# Patient Record
(demographics unavailable — no encounter records)

---

## 2024-11-12 NOTE — HISTORY OF PRESENT ILLNESS
[FreeTextEntry1] : Follow up Sanjay is a 75 year old male presenting to [facility] care for history of epilepsy.  HPI Doing well No seizures EEG shows continuos R FC region. Doesn't want to increase dose  No other issues.  Urology w/up  History of L arm twitching as a kid in [facility]. Escaped to [place] during the revolution and had the first and only GTC of his life. On Dilantin for many years and switched to Oxcarb. Currently taking 300mg nightly.  Has not followed up with a neurologist for over 15 years but has previous records. EEG from 2005 showed focal right fronto-parietal sharp waves, possibly indicative of an underlying lesion. MRI in 2005 showed R somatosensory/post-central gyrus focus, recommended interval follow up. Notes his mother experienced an infection when she was 6 months pregnant with him, and previous neurologists attributed the possible lesion to this abnormal pregnancy.  Experiences focal seizures several times per week, always during sleep. Pt wakes up with L upper extremity myoclonus that lasts for several seconds, always retains awareness. Last occurred a few days ago. Can go a month without an episode. Has been on 300mg Oxcarb for many years.  PMH significant for SVT on Metoprolol. Also complains of lower extremity numbness. Describes it as feeling he has shoes on, but without wearing shoes.  HPI Had a focal seizure last week after he took bactrim. On  mg he has few events. Feeling better overall Takes all at night.  Sleep issues.

## 2024-11-12 NOTE — DISCUSSION/SUMMARY
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 Last MRI / EEG >15 years ago. Taking Oxcarb 450 mg for many years, still experiences occasional nocturnal focal left seizures.

## 2025-02-12 NOTE — LETTER BODY
[Dear  ___] : Dear  [unfilled], [Courtesy Letter:] : I had the pleasure of seeing your patient, [unfilled], in my office today. [Please see my note below.] : Please see my note below. [Consult Closing:] : Thank you very much for allowing me to participate in the care of this patient.  If you have any questions, please do not hesitate to contact me. [FreeTextEntry3] : Best Regards,   Jennifer Cruz MD

## 2025-02-12 NOTE — ASSESSMENT
[FreeTextEntry1] : We discussed the patient's lower urinary tract symptoms on tamsulosin 0.4 mg daily which she confirms are not to the level where he would like either more medication, higher doses, or surgical intervention at this point.  Tamsulosin was renewed at the present level.  As for the more pressing issue noted from his prior cystoscopy and my recommendations after that test, this was not performed because of his other health and neurologic issues.  These appear to be coming under better control but are not yet stable.  In view of this situation, urine sent today for culture cytology and FISH test and blood sent for PSA.  If all these remain stable, then we will allow him to continue as is while he stabilizes his other health issues and reassess in 6 months.  Otherwise, we will discuss further continue continuing with testing and or prostate biopsy sooner. Jennifer Cruz MD

## 2025-02-12 NOTE — HISTORY OF PRESENT ILLNESS
[FreeTextEntry1] : 75 YO M seen 2023 as NPT for an enlarged prostate. He told me that he has had an enlarged prostate for over 10 years. He saw urologist Fransisco 10 years ago and was prescribed tamsulosin 0.4 mg. He continues on this medication with reasonable results until the pandemic, when results waned. He finally stopped all together 2 - 3 weeks ago and feels that he is actually urinating better off it. He continues to have significant nocturia x 4 - 5 and averaged 500 ml/ night of urine. He denies dysuria, gross hematuria prior kidney stones.  Recent PSA 3.94 from .. No confirmed FH of prostate cancer, father  age 85.  UA large RBC (Chronic problem >> 10 years, no formal evaluation yet.) IPSS 22 KENYETTA NA ALEC 2 PVR 32 ml. We discussed the patient's lower urinary tract symptoms and marked nocturia.  In the face of his enlarged prostate gland but PVR, the reasons for his symptoms are not readily apparent.  We also discussed the paradoxical effect of improvement off of the tamsulosin.  I recommended further studies to elucidate the etiology of his symptoms and to determine the most appropriate treatment moving forward, either medication or otherwise.  To this end I recommended he return with a full bladder and undergo uroflowmetry and pelvic ultrasound.  I reviewed with him the indications risks alternatives and chances for success with these test and this approach and he is amenable.  Appointments were made.  PSA test was also repeated today to confirm stability over the last 8 months. As for the patient's chronic microscopic hematuria, I did send urine today for culture and cytology.  We will review these on his next visit and determine if any further evaluation is necessary at this time.  Patient seen 2023 to reassess LUTS, including uroflow and pelvic sonogram, and to repeat PSA and urine cytology. He reports a slight improvement in his lower urinary tract symptoms. He states that he's had chronic microhematuria for 20+ years.  UA large RBC IPSS 11 uroflow: inaccurate; avg rate 3 ml/s, max rate 10 ml/s (voided 72 cc) pelvic sono: PVR 50cc , 88 cc prostate We discussed the results of his uroflow, which was inaccurate. Since his lower urinary symptoms have slightly improved, we will reassess this in 1 month to repeat a uroflowmetry. He will come early and drink at the office to full bladder capacity. Regarding the chronic microhematuria and mildly abnormal recent urine cytology, a repeat cytology was sent, in addition to a FISH test. I explained the significance of these tests. If either of these tests are abnormal, he will have a hematuria evaluation including CT scan of the abdomen and pelvis, followed by an in-office cystoscopy. A PSA was repeated today to reassess his recent elevated PSA. However, he rode a bike 2 days ago. If this remains elevated, we will repeat his PSA (total and free) after at least 3 days of abstinence.  PSA 4.72  (50%) Improved, to repeat in 4 - 6 weeks. Urine cytology and FISH both negative.  Patient seen 3/24/2023 to reassess. Patient told me that he continues to find his LUTS bothersome. He is still reluctant to go on alpha blocker medication sine he feels that he is urinating better after stopping the tamsulosin.  UA large RBC (Chronic) Uroflow good: Vol 197 ml, V Max 13 ml/s, V Ave 8 ml/s,  ml. Patient also on medication for seizure disorder (carbamazepine) and for HTN/ SVT (amlodipine, metoprolol, rosuvastatin).  NKMA   The patient denies fevers, chills, nausea and or vomiting and no unexplained weight loss.  All pertinent parts of the patient PFSH (past medical, family and social histories), laboratory, radiological studies and physician notes were reviewed prior to starting the face to face portion of the  visit. Questionnaire results were discussed with patient. We discussed the patient's previous lab work which included a normal cytology and FISH test and an improved PSA value.  As planned we repeated the PSA today to trend the results.  We also repeated urine culture which was not done on his last visit due to lack of sufficient urine. We also discussed his persistent lower urinary tract symptoms in the face of mildly elevated PVR today but a relatively normal uroflowmetry test.  I gave the patient 2 alternatives to alpha-blocker medication since he continues to be resistant to trying this approach again.  These options include either no active treatment and follow-up to repeat the flow and PVR in 3 months or further evaluation with urodynamics and cystoscopy at this time.  He has elected to go with the former and we made plans for repeat evaluation in 3 months with uroflowmetry and PVR evaluation assuming that the blood and urine tests from today returned back stable to improved.  Patient is in agreement with this plan appointments were made.  PSA 4.51  Patient seen 2023 to reassess LUTS with uroflow and PVR. He remains off the tamsulosin and his lower urinary tract symptoms remain stable. UA large RBC (Chronic x >20 years). IPSS 14 Uroflow: avg rate 5.5 ml/s, max rate 13.4 ml/s (voided 100 cc) PVR 56 cc He is doing well urologically and he will remain off the tamsulosin in view of his stable LUTS. Regarding his longstanding microhematuria, a urine C+S and cytology are pending. A PSA is pending and we will call him with that result. If this remains stable and there are no new problems, he will follow up in 6 months. As for his up-coming CT scan and likely colonoscopy, we discussed and I recommended that he take tamsulosin the night before and the night of his planned tests and procedures to minimize the chance of urinary retention from his known BPH. PSA 5.49. F/u in 6 months as planned.  2024 Patient dropped off urine specimen yesterday, grossly bloody (old), confirmed by UA. C+S pending. Requires evaluation for gross hematuria with cytology, cystoscopy and CT scan. Staff to schedule F/U. C+S negative  Patient seen 2024 to assess the Gross hematuria. Requires CT scan and cystoscopy. he told me that he has had frequency, urgency dysuria with red urine over the last week. HE went to ER 2024, H+H 12.6/35.7 UA today, red urine (Cool aid)  Large RBC, Pro 100, WBC negative, Nitrite negative. CYSTOSCOPY: NO intrinsic bladder lesions, UOs not visible due to large, obstructing prostate with bullous inflammation of the prostatic urethra with bleeding. This appears to be the cause of his gross hematuria. Normal anterior urethra. Findings today were discussed with the patient and his wife. The bleeding from the prostate gland is likely from the bullous edema that lines the internal prostatic mucosa. The actual etiology of this remains uncertain at this time. I recommended that we proceed to CT scan of the abdomen pelvis with and without intravenous contact now to rule out any upper abdominal lesions. I also recommended that we treat the patient with resumption of the tamsulosin 0.4 mg and addition of finasteride 5 mg daily and a 7-day course of Bactrim DS. We will reassess his response course of action and review the CT scan when it is available. If the upper urinary tracts are normal, then we will plan to meet again in 1 month to repeat cystoscopy to reassess the prostatic urethra and hopefully view the ureteral orifices and a less bloody field.   2024 [facility] Sent: 2024 12:27:52 PM Eastern Standard Time Sender: Isabel Cruz MD Subject: RE: Had to stop two of the meds. Body: I will have my staff contact you to set up a phone consultation. ISABEL CRUZ MD  Sent: 2024 8:40:26 PM Eastern Standard Time Sender: Mickey Silverman Subject: RE: Had to stop two of the meds. Body: I was hoping to speak with you today regarding some challenges I've encountered with the medications. Notably, I chose not to discontinue Flomax as it has been helpful. However, the other two medications have posed significant issues, which I would like to discuss in detail once we have the opportunity to talk. 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 Additionally, I have been consistently experiencing heart palpitations throughout the day, a known side effect of the antibiotic. Interestingly, today I did not experience any heart palpitations. Furthermore, I've been dealing with persistent heartburn and acid reflux, which were not problems when I was on CIPRO. I'm curious about the choice of antibiotics and wondered why we did not use CIPRO which you mentioned while we were there.  Patient conference 2024 via TTM. He has not has any more seizures since he stopped Bactrim and Finasteride. He also notes relief of the heart palpitations and GERD since he stopped these, He continues on the tamsulosin 0.4 mg and the urine flow much easier.  WE discussed remaining off the finasteride and Bactrim and remaining on the tamsulosin. He has a follow-up appointment on  with a neurologist and a follow-up appointment in our office February night to retest his urine. He is also scheduled for a follow-up cystoscopy later in the month and I encouraged him to continue on these plans. Since his symptoms have improved on the present regimen of tamsulosin only, we will continue this treatment regimen for now.  CT scan 2024:  IMPRESSION: No urothelial lesion. The prostate is enlarged.  Patient seen 2024  to reassess. He told me that he has had no further gross hematuria and no further seizure activity. He remains on the tamsulosin.  UA large RBC IPSS 14 We reviewed the benign results of the CT scan from last week. I also reviewed. The urine culture today in view of his recent instrumentation and treatment. We discussed the continued need to completely clear the bladder for other possible causes of his gross hematuria since cystoscopy a few weeks ago while he was bleeding limited total visualization of the bladder mucosa. This is scheduled to take place later in the month and I encouraged him to maintain that appointment.   Patient seen 2024 via TTM after cancelling his follow up cystoscopy.  He told me that his urinary problems are better than the last 2 years. HIs main problems are now due to his GI issues. He is scheduled to see his GI MD next week. He is hesitant to undergo a repeat cystoscopy for the gross hematuria and asks if a MP MRI would suffice. I reviewed in detail the indications for repeat cystoscopy to fully clear his bladder with a possible reason for his gross hematuria. We reviewed the differences between MRI studies and what was appropriate to clear the bladder. In my opinion there is no substitute for cystoscopy and that is what I recommended to be done. After a lengthy discussion in which the patient had a chance to and all his questions and have them answered to his satisfaction, he subsequently decided to proceed with the cystoscopy in the office under local anesthesia and nitrous oxide inhalation. My staff will call him to schedule at his request.   Patient seen 5/10/2024 for cystoscopy. He told me that he has not had further gross hematuria but has also noted loss of ejaculate. He also has had pain in the right groin which has become worse since the gross hematuria. He has a known RIH which does extrude when he is up and about, but which he treats with a Truss. It easily retracrs with lying down. He comes today for repeat Cystoscopy. UA large RBC only. CYSTOSCOPY: No intrinsic bladder lesions, large capacity bladder, large and obstructing prostate with marked erythema, irregular mucosa, and inflammatory changes in the prostatic mucosa. These changes include the Veru which is swollen and likely is the cause of his loss of ejaculation.  I discussed findings today with the patient and his wife. I recommended that he undergo transurethral biopsy of the prostatic mucosa and limited resection of that area for diagnostic purposes. At cystoscopy under anesthesia we will also determine whether a biopsy or unroofing of the Veru should be considered. We reviewed the indication risks alternatives and chances for success with this approach. Because of the patient's cardiac and seizure history, we will have him evaluated by medicine cardiology and his neurologist prior to undergoing general anesthesia. The patient is in agreement with this plan and we will schedule this to be done with a 23-hour stay at [facility].   Patient seen TODAY 2025 to revisit these issues. He told me that he continues to have seizures and is under care of his Neurologist for these. He has not had any gross hematuria or dysuria, but does note difficulty starting the stream during the night. He remains on the tamsulosin 0.4 mg and requests a refill. He does NOT feel that his symptoms require either more medication or any surgical intervention. He has also noted intermittent discomfort in the right groin with ejaculation. UA large RBC IPSS 14

## 2025-02-12 NOTE — PHYSICAL EXAM
[General Appearance - In No Acute Distress] : no acute distress [Edema] : no peripheral edema [] : no respiratory distress [Abdomen Soft] : soft [Abdomen Tenderness] : non-tender [Abdomen Mass (___ Cm)] : no abdominal mass palpated [Abdomen Hernia] : no hernia was discovered [Costovertebral Angle Tenderness] : no ~M costovertebral angle tenderness [Urethral Meatus] : meatus normal [Epididymis] : the epididymides were normal [Testes Tenderness] : no tenderness of the testes [Testes Mass (___cm)] : there were no testicular masses [Prostate Tenderness] : the prostate was not tender [No Prostate Nodules] : no prostate nodules [Prostate Size ___ (0-4)] : prostate size [unfilled] (scale: 0-4) [Normal Station and Gait] : the gait and station were normal for the patient's age [Skin Turgor] : supple [No Focal Deficits] : no focal deficits [Oriented To Time, Place, And Person] : oriented to person, place, and time [Affect] : the affect was normal [Mood] : the mood was normal

## 2025-04-09 NOTE — HISTORY OF PRESENT ILLNESS
[FreeTextEntry1] : Follow up   Sanjay is a 76 year old male [facility].   HPI  2025  No changes No sz lately  Sleep issues   Goes to bed and urinate several times.  Med On  mg Qday  Doesnt want to change dose  Having same urologic conditions Some pedal edema  HPI Doing well  No seizures  EEG shows continuos R FC region. Doesn't want to increase dose  No other issues.   Urology w/up  History of L arm twitching as a kid in [facility]. Escaped to [place] during the revolution and had the first and only GTC of his life. On Dilantin for many years and switched to Oxcarb. Currently taking 300mg nightly.  Has not followed up with a neurologist for over 15 years but has previous records. EEG from  showed focal right fronto-parietal sharp waves, possibly indicative of an underlying lesion. MRI in  showed R somatosensory/post-central gyrus focus, recommended interval follow up. Notes his mother experienced an infection when she was 6 months pregnant with him, and previous neurologists attributed the possible lesion to this abnormal pregnancy.  Experiences focal seizures several times per week, always during sleep. Pt wakes up with L upper extremity myoclonus that lasts for several seconds, always retains awareness. Last occurred a few days ago. Can go a month without an episode. Has been on 300mg Oxcarb for many years.  PMH significant for SVT on Metoprolol. Also complains of lower extremity numbness. Describes it as feeling he has shoes on, but without wearing shoes.  HPI Had a focal seizure last week after he took bactrim. On  mg he has few evenst.  Feeling better overall   Active Problems Anxiety (300.00) (F41.9) Asymptomatic microscopic hematuria (599.72) (R31.21) Bilateral carotid artery disease, unspecified type (447.9) (I77.9) BPH associated with nocturia (600.01,788.43) (N40.1,R35.1) Chronic GERD (530.81) (K21.9) Chronic throat clearing (786.09) (R09.89) Clogged ear (388.8) (H93.8X9) Diverticulosis of colon, acquired (562.10) (K57.30) Elevated PSA (790.93) (R97.20) Essential hypertension (401.9) (I10) Microhematuria (599.72) (R31.29) Moderate aortic insufficiency (424.1) (I35.1) Moderate mitral regurgitation (424.0) (I34.0) Paroxysmal SVT (supraventricular tachycardia) (427.0) (I47.1) Preop cardiovascular exam (V72.81) (Z01.810) Seizure disorder (345.90) (G40.909) Seizures (780.39) (R56.9) Thoracic aortic aneurysm without rupture (441.2) (I71.20)      Past Medical History URI, acute (465.9) (J06.9)      Surgical History History of Suture Of Small Intestine      Family History Family history of  : Father, Mother Family history of lung cancer (V16.1) (Z80.1) : Father Family history of malignant neoplasm (V16.9) (Z80.9) : Sibling Family history of Heart problem : Mother, Father      Social History Exercise: Walking  Never smoker No alcohol use No illicit drug use      Current Meds  Magnesium Oxide -Mg Supplement 400 (240 Mg) MG Oral Tablet Metoprolol Succinate ER 25 MG Oral Tablet Extended Release 24 Hour; TAKE 1 TABLET DAILY Multi-Vitamin TABS Omeprazole 20 MG Oral Capsule Delayed Release; TAKE 1 CAPSULE BY MOUTH DAILY OXcarbazepine 300 MG Oral Tablet; TAKE 1 AND 1/2 TABLETS BY MOUTH TWICE DAILY AS DIRECTED Rosuvastatin Calcium 10 MG Oral Tablet; TAKE 1 TABLET DAILY

## 2025-04-09 NOTE — REVIEW OF SYSTEMS
[As Noted in HPI] : as noted in HPI [Incontinence] : incontinence [Nocturia] : nocturia [Negative] : Psychiatric